# Patient Record
(demographics unavailable — no encounter records)

---

## 2024-10-26 NOTE — HISTORY OF PRESENT ILLNESS
[Fever] : FEVER [___ Day(s)] : [unfilled] day(s) [Constant] : constant [Ibuprofen] : ibuprofen [Sore Throat] : sore throat [Max Temp: ____] : Max temperature: [unfilled] [Sick Contacts: ___] : no sick contacts [Change in sleep pattern] : no change in sleep pattern [Ear Pain] : no ear pain [Decreased Appetite] : no decreased appetite [Vomiting] : no vomiting [Rash] : no rash [FreeTextEntry5] : intermittent abdominal pain - yesterday, BM - passed "nuggets"  [de-identified] : concerns that she had a fever on 10/1-10/2, resolved with no other symptoms present

## 2024-10-26 NOTE — DISCUSSION/SUMMARY
[FreeTextEntry1] : reviewed negative strep testing throat culture and RVP pending  discussed that etiology of current fever and fever earlier this month unclear at this time but may be viral  supportive care measures encouraged RTO if sx fail to improve or worsen  medical services provided in office outside of normally scheduled office hours

## 2024-11-04 NOTE — REVIEW OF SYSTEMS
[NI] : Genitourinary  [Nl] : Endocrine [Rhinorrhea] : rhinorrhea [Nasal Congestion] : nasal congestion [Postnasl Drip] : postnasal drip [Wheezing] : wheezing [Cough] : cough

## 2024-11-04 NOTE — PHYSICAL EXAM

## 2024-11-05 NOTE — PHYSICAL EXAM
[Well Nourished] : well nourished [Well Developed] : well developed [Alert] : ~L alert [Active] : active [Normal Breathing Pattern] : normal breathing pattern [No Respiratory Distress] : no respiratory distress [No Allergic Shiners] : no allergic shiners [No Drainage] : no drainage [No Conjunctivitis] : no conjunctivitis [Tympanic Membranes Clear] : tympanic membranes were clear [No Nasal Drainage] : no nasal drainage [No Polyps] : no polyps [No Sinus Tenderness] : no sinus tenderness [No Oral Pallor] : no oral pallor [No Oral Cyanosis] : no oral cyanosis [Non-Erythematous] : non-erythematous [No Exudates] : no exudates [No Postnasal Drip] : no postnasal drip [No Tonsillar Enlargement] : no tonsillar enlargement [Absence Of Retractions] : absence of retractions [Symmetric] : symmetric [Good Expansion] : good expansion [No Acc Muscle Use] : no accessory muscle use [Good aeration to bases] : good aeration to bases [Equal Breath Sounds] : equal breath sounds bilaterally [No Crackles] : no crackles [No Rhonchi] : no rhonchi [Normal Sinus Rhythm] : normal sinus rhythm [No Heart Murmur] : no heart murmur [Soft, Non-Tender] : soft, non-tender [No Hepatosplenomegaly] : no hepatosplenomegaly [Non Distended] : was not ~L distended [Abdomen Mass (___ Cm)] : no abdominal mass palpated [Full ROM] : full range of motion [No Clubbing] : no clubbing [Capillary Refill < 2 secs] : capillary refill less than two seconds [No Cyanosis] : no cyanosis [No Petechiae] : no petechiae [No Kyphoscoliosis] : no kyphoscoliosis [No Contractures] : no contractures [No Rashes] : no rashes [FreeTextEntry4] : congested nasal mucosa [de-identified] : age appropriate

## 2024-11-05 NOTE — CONSULT LETTER
[Dear  ___] : Dear  [unfilled], [Consult Letter:] : I had the pleasure of evaluating your patient, [unfilled]. [Please see my note below.] : Please see my note below. [Consult Closing:] : Thank you very much for allowing me to participate in the care of this patient.  If you have any questions, please do not hesitate to contact me. [Sincerely,] : Sincerely, [FreeTextEntry3] : If you have any questions please feel free to contact my office at [phone].\par  \par  Sincerely,\par  \par  Ashley Bradley, MSN, CPNP-PC\par  Pediatric Nurse Practitioner\par  Division of Pediatric Pulmonary Medicine & Cyst[facility] Fib[facility]\par  [facility]\par

## 2024-11-05 NOTE — REASON FOR VISIT
[Routine Follow-Up] : a routine follow-up visit for [Cough] : cough [Mother] : mother [Medical Records] : medical records [Father] : father

## 2024-11-05 NOTE — HISTORY OF PRESENT ILLNESS
[FreeTextEntry1] : Mild persistent asthma, eczema, seasonal allergies (dog, tree, grass and weed pollens)  Nov 05, 2024 FOLLOW UP: Interval Hx: -Last seen May 2024, recs: d/c ICS and restart  fluticasone propionate 44 2 puffs BID -Doing well per parents -Remained on  fluticasone propionate 44 2 puffs BID and no exacerbations since last visit Daily meds:  fluticasone propionate 44 2 puffs BID Rescue meds: Albuterol PRN Recent ER visits/hospitalizations: denies Last oral steroid course: denies Baseline daytime cough, SOB or wheeze: denies Baseline nocturnal cough, SOB or wheeze: denies Exertional cough, SOB or wheeze: denies Allergic rhinitis symptoms: yes  Flu vaccine 2024-25: yes ==   May 02, 2024 FOLLOW UP: Interval Hx: -Last seen Sept 2023, recs: Flovent 44 2 puffs BID -Doing well per father -Had strep throat in February and few mild URIs since last visit -Father notices alelrgic rhinitis in past few weeks, uses xyzal daily, afrin nasal spray for past 3 days Daily meds: Flovent 44 2 puffs BID, xyzal daily Rescue meds: Albuterol PRN Recent ER visits/hospitalizations: denies Last oral steroid course: denies Baseline daytime cough, SOB or wheeze: denies Baseline nocturnal cough, SOB or wheeze: denies Exertional cough, SOB or wheeze: denies Allergic rhinitis symptoms: yes  Flu vaccine [id number]:yes   ==   Sep 21, 2023 FOLLOW UP: Interval Hx:  -Last seen July 2023, recs: Flovent 44 2 puffs BID, if doing well d/c end of July and restart with first URI of the fall -Did well off ICS this summer, restarted on 9/1/23 -Traveled to [place] this summer and did well  -Has rash when exposed to dogs, uses albuterol before visiting [place] and aunt's house -Has mild URI symptoms for 2-3 days with nasal congestion Daily meds: Flovent 44 2 puffs BID, xyzal Rescue meds: Albuterol PRN  Recent ER visits/hospitalizations: denies  Last oral steroid course:  denies  Baseline daytime cough, SOB or wheeze: denies  Baseline nocturnal cough, SOB or wheeze:  denies  Exertional cough, SOB or wheeze: denies  Allergic rhinitis symptoms: xyzal  Flu vaccine: yes  ==  Jul 06, 2023 FOLLOW UP: Interval Hx:  -Last seen May 2023, recs: Flovent 44 2 puffs BID and prednisolone x 5 days for wheezing -Doing well per parents,  -Did not give OCS burst recommended at last visit, symptoms improved with albuterol q4h -Flovent 44 2 puffs BID -Albuterol PRN when exposed to dogs at cousin's house -Traveling to [facility] for 6 weeks  Daily meds: Flovent 44 2 puffs BID, xyzal Rescue meds: Albuterol PRN  Recent ER visits/hospitalizations: denies  Last oral steroid course:  denies  Baseline daytime cough, SOB or wheeze: denies  Baseline nocturnal cough, SOB or wheeze:  denies  Exertional cough, SOB or wheeze: denies  Allergic rhinitis symptoms: xyzal  Flu vaccine: yes  COVID 19 vaccine:  yes  Misc notes: Needs letter for school for albuterol PRN  ==   May 03, 2023 NEW PATIENT  3yr old female child with history of allergic rhinitis, recurrent cough with viral illnesses since fall/winter 2022 -Sounded "tight" with wheezing last Thursday at PCP, recs: albuterol and budesonide BID, wheeze is improved but cough is getting worse. Referred to pulm by PCP -Used albuterol x1 this AM (used q4h yesterday)  -Skin test +positive test to dog, tree, grass and weed pollens -Coughs when exposed to dogs (MGM, maternal aunt have pet dogs) -Vaccines UTD, rec flu vax and COVID 19 vax -Attends school   PMH: denies  PSH: denies  Meds: zyrtec, xyzal, afrin Birth Hx:  FT, NVSD- denies complications PCP/Specialists: Dr. Campbell Family hx:  Mo- Healthy Fa- Asthma, seasonal allergies s/p immunotherapy as child No siblings Family hx of asthma: father and maternal uncle Family hx of cystic fibrosis, autoimmune disease, recurrent respiratory infections: denies Feeding issues, CLARA:    denies Hx of Eczema:  yes , HC ointment PRN Hx of rhinitis, post nasal drip: yes  Hx of recurrent infections (ie: pneumonia, AOM, sinusitis): sinus infection annually  Seen by pulmonologist before:  denies   Cough Hx: Triggers: viral illnesses, sinus Allergies: yes  Hx of wheezing: yes x1 Use of oral steroids: denies ED/Hospitalizations:  ED as infant for UTI , no resp issues Snoring: denies Daytime cough: denies  Nighttime cough:  denies  Respiratory symptoms with exercise: denies in the past, more recently in past few weeks Chest x-ray: denies     Modified Asthma Predictive Index (mAPI): 4 wheezing illnesses AND 1 major criteria: Parental asthma   yes   atopic dermatitis   yes  aeroallergen sensitization   yes   OR  2 minor criteria: Food sensitization   NO  peripheral blood eosinophilia =4%  NO  wheezing apart from colds   NO

## 2024-11-05 NOTE — HISTORY OF PRESENT ILLNESS
[FreeTextEntry1] : Mild persistent asthma, eczema, seasonal allergies (dog, tree, grass and weed pollens)  Nov 05, 2024 FOLLOW UP: Interval Hx: -Last seen May 2024, recs: d/c ICS and restart  fluticasone propionate 44 2 puffs BID -Doing well per parents -Remained on  fluticasone propionate 44 2 puffs BID and no exacerbations since last visit Daily meds:  fluticasone propionate 44 2 puffs BID Rescue meds: Albuterol PRN Recent ER visits/hospitalizations: denies Last oral steroid course: denies Baseline daytime cough, SOB or wheeze: denies Baseline nocturnal cough, SOB or wheeze: denies Exertional cough, SOB or wheeze: denies Allergic rhinitis symptoms: yes  Flu vaccine 2024-25: yes ==   May 02, 2024 FOLLOW UP: Interval Hx: -Last seen Sept 2023, recs: Flovent 44 2 puffs BID -Doing well per father -Had strep throat in February and few mild URIs since last visit -Father notices alelrgic rhinitis in past few weeks, uses xyzal daily, afrin nasal spray for past 3 days Daily meds: Flovent 44 2 puffs BID, xyzal daily Rescue meds: Albuterol PRN Recent ER visits/hospitalizations: denies Last oral steroid course: denies Baseline daytime cough, SOB or wheeze: denies Baseline nocturnal cough, SOB or wheeze: denies Exertional cough, SOB or wheeze: denies Allergic rhinitis symptoms: yes  Flu vaccine [id number]:yes   ==   Sep 21, 2023 FOLLOW UP: Interval Hx:  -Last seen July 2023, recs: Flovent 44 2 puffs BID, if doing well d/c end of July and restart with first URI of the fall -Did well off ICS this summer, restarted on 9/1/23 -Traveled to [place] this summer and did well  -Has rash when exposed to dogs, uses albuterol before visiting [place] and aunt's house -Has mild URI symptoms for 2-3 days with nasal congestion Daily meds: Flovent 44 2 puffs BID, xyzal Rescue meds: Albuterol PRN  Recent ER visits/hospitalizations: denies  Last oral steroid course:  denies  Baseline daytime cough, SOB or wheeze: denies  Baseline nocturnal cough, SOB or wheeze:  denies  Exertional cough, SOB or wheeze: denies  Allergic rhinitis symptoms: xyzal  Flu vaccine: yes  ==  Jul 06, 2023 FOLLOW UP: Interval Hx:  -Last seen May 2023, recs: Flovent 44 2 puffs BID and prednisolone x 5 days for wheezing -Doing well per parents,  -Did not give OCS burst recommended at last visit, symptoms improved with albuterol q4h -Flovent 44 2 puffs BID -Albuterol PRN when exposed to dogs at cousin's house -Traveling to [facility] for 6 weeks  Daily meds: Flovent 44 2 puffs BID, xyzal Rescue meds: Albuterol PRN  Recent ER visits/hospitalizations: denies  Last oral steroid course:  denies  Baseline daytime cough, SOB or wheeze: denies  Baseline nocturnal cough, SOB or wheeze:  denies  Exertional cough, SOB or wheeze: denies  Allergic rhinitis symptoms: xyzal  Flu vaccine: yes  COVID 19 vaccine:  yes  Misc notes: Needs letter for school for albuterol PRN  ==   May 03, 2023 NEW PATIENT  3yr old female child with history of allergic rhinitis, recurrent cough with viral illnesses since fall/winter 2022 -Sounded "tight" with wheezing last Thursday at PCP, recs: albuterol and budesonide BID, wheeze is improved but cough is getting worse. Referred to pulm by PCP -Used albuterol x1 this AM (used q4h yesterday)  -Skin test +positive test to dog, tree, grass and weed pollens -Coughs when exposed to dogs (MGM, maternal aunt have pet dogs) -Vaccines UTD, rec flu vax and COVID 19 vax -Attends school   PMH: denies  PSH: denies  Meds: zyrtec, xyzal, afrin Birth Hx:  FT, NVSD- denies complications PCP/Specialists: Dr. Campbell Family hx:  Mo- Healthy Fa- Asthma, seasonal allergies s/p immunotherapy as child No siblings Family hx of asthma: father and maternal uncle Family hx of cystic fibrosis, autoimmune disease, recurrent respiratory infections: denies Feeding issues, CLARA:    denies Hx of Eczema:  yes , HC ointment PRN Hx of rhinitis, post nasal drip: yes  Hx of recurrent infections (ie: pneumonia, AOM, sinusitis): sinus infection annually  Seen by pulmonologist before:  denies   Cough Hx: Triggers: viral illnesses, sinus Allergies: yes  Hx of wheezing: yes x1 Use of oral steroids: denies ED/Hospitalizations:  ED as infant for UTI , no resp issues Snoring: denies Daytime cough: denies  Nighttime cough:  denies  Respiratory symptoms with exercise: denies in the past, more recently in past few weeks Chest x-ray: denies     Modified Asthma Predictive Index (mAPI): 4 wheezing illnesses AND 1 major criteria: Parental asthma   yes   atopic dermatitis   yes  aeroallergen sensitization   yes   OR  2 minor criteria: Food sensitization   NO  peripheral blood eosinophilia =4%  NO  wheezing apart from colds   NO

## 2024-11-05 NOTE — CONSULT LETTER
[Dear  ___] : Dear  [unfilled], [Consult Letter:] : I had the pleasure of evaluating your patient, [unfilled]. [Please see my note below.] : Please see my note below. [Consult Closing:] : Thank you very much for allowing me to participate in the care of this patient.  If you have any questions, please do not hesitate to contact me. [Sincerely,] : Sincerely, [FreeTextEntry3] : If you have any questions please feel free to contact my office at [phone].\par  \par  Sincerely,\par  \par  Ashley Bradley, MSN, CPNP-PC\par  Pediatric Nurse Practitioner\par  Division of Pediatric Pulmonary Medicine & Cystic Fibrosis Center\par  [facility]\par

## 2024-11-05 NOTE — PHYSICAL EXAM
[Well Nourished] : well nourished [Well Developed] : well developed [Alert] : ~L alert [Active] : active [Normal Breathing Pattern] : normal breathing pattern [No Respiratory Distress] : no respiratory distress [No Allergic Shiners] : no allergic shiners [No Drainage] : no drainage [No Conjunctivitis] : no conjunctivitis [Tympanic Membranes Clear] : tympanic membranes were clear [No Nasal Drainage] : no nasal drainage [No Polyps] : no polyps [No Sinus Tenderness] : no sinus tenderness [No Oral Pallor] : no oral pallor [No Oral Cyanosis] : no oral cyanosis [Non-Erythematous] : non-erythematous [No Exudates] : no exudates [No Postnasal Drip] : no postnasal drip [No Tonsillar Enlargement] : no tonsillar enlargement [Absence Of Retractions] : absence of retractions [Symmetric] : symmetric [Good Expansion] : good expansion [No Acc Muscle Use] : no accessory muscle use [Good aeration to bases] : good aeration to bases [Equal Breath Sounds] : equal breath sounds bilaterally [No Crackles] : no crackles [No Rhonchi] : no rhonchi [Normal Sinus Rhythm] : normal sinus rhythm [No Heart Murmur] : no heart murmur [Soft, Non-Tender] : soft, non-tender [No Hepatosplenomegaly] : no hepatosplenomegaly [Non Distended] : was not ~L distended [Abdomen Mass (___ Cm)] : no abdominal mass palpated [Full ROM] : full range of motion [No Clubbing] : no clubbing [Capillary Refill < 2 secs] : capillary refill less than two seconds [No Cyanosis] : no cyanosis [No Petechiae] : no petechiae [No Kyphoscoliosis] : no kyphoscoliosis [No Contractures] : no contractures [No Rashes] : no rashes [FreeTextEntry4] : congested nasal mucosa [de-identified] : age appropriate

## 2024-11-06 NOTE — HISTORY OF PRESENT ILLNESS
[Normal] : Normal [Brushing teeth] : Brushing teeth [Toothpaste] : Primary Fluoride Source: Toothpaste [In ] : In  [No difficulties with Homework] : No difficulties with homework  [No] : No cigarette smoke exposure [Water heater temperature set at <120 degrees F] : Water heater temperature set at <120 degrees F [Car seat in back seat] : Car seat in back seat [Carbon Monoxide Detectors] : Carbon monoxide detectors [Smoke Detectors] : Smoke detectors [Supervised outdoor play] : Supervised outdoor play [Up to date] : Up to date [Fruit] : fruit [Meat] : meat [Dairy] : dairy [Yes] : Patient goes to dentist yearly [Appropiate parent-child-sibling interaction] : Appropriate parent-child-sibling interaction [Child Cooperates] : Child cooperates [Parent has appropriate responses to behavior] : Parent has appropriate responses to behavior [FreeTextEntry3] : sleeps about 10 hours per night, pull up overnight

## 2024-11-06 NOTE — DEVELOPMENTAL MILESTONES
[Plays and interacts with peer] : plays and interacts with peer [None] : none [Goes to the bathroom independently] : goes to bathroom independently [Is dry through the day] :  is dry through the day [Counts 5 objects] : counts 5 objects [Names 3 or more numbers] : names 3 or more numbers [Copies a triangle] : copies a triangle [Copies first name] : copies first name [Writes 2 or more letters] : writes 2 or more letters [Yes] : Completed. [Dresses and undresses without help] : dresses and undresses without help [Walks on tiptoes when asked] : walks on tiptoes when asked [FreeTextEntry1] : recognizes sight words

## 2024-12-03 NOTE — DISCUSSION/SUMMARY
[FreeTextEntry1] : 5 year old female presents with persistent/worsening cough.  In no acute distress. Appears adequately hydrated. RVP pending due to concern for atypical PNA.  Return if symptoms worsen or persist.

## 2024-12-03 NOTE — HISTORY OF PRESENT ILLNESS
[FreeTextEntry6] : Patient's chief complaint is persistent cough x2 weeks.  Presents with c/o cough/congestion x 14 days. Has had trouble sleeping.  As per Dad cough is getting worse.  +throat pain when coughing Was recently treated for Sinusitis with Cefdinir. Last dose of medication was Saturday.  Fever: none   Meds given: Albuterol, Flovent  Appetite/activity at baseline, drinking well, good UO. No vomiting/No diarrhea. + school/sick contacts.  Potential Allergy to Penicillin.

## 2024-12-03 NOTE — PHYSICAL EXAM
[Erythematous Oropharynx] : erythematous oropharynx [Clear to Auscultation Bilaterally] : clear to auscultation bilaterally [Wheezing] : no wheezing [Crackles] : no crackles [Tachypnea] : no tachypnea [NL] : warm, clear [FreeTextEntry4] : +congestion [FreeTextEntry7] : +dry cough

## 2025-02-15 NOTE — PHYSICAL EXAM
[NL] : no acute distress, alert [Erythematous Labia Majora] : erythematous labia majora [Patent] : patent [Erythema surrounding anus] : no erythema surrounding anus

## 2025-02-15 NOTE — DISCUSSION/SUMMARY
[FreeTextEntry1] : Discussed that skin symptoms are consistent with vaginitis  - Recommend to restart Nystatin Triamcinolone as previously prescribed  -Daily warm bathing is helpful as follows: Allow the child to soak in clean water (no soap) for 10 to 15 minutes. Use soap to wash regions other than the genital area just before taking the child out of the tub. Limit use of any soap on genital areas. Rinse the genital area well and gently pat dry. -Encouraged use of water for cleansing of the vaginal area on the nights where she is not bathing  -Do not use bubble baths or perfumed soaps. -Continue application of emollients, which may help protect skin. -Review hygiene with the child. Emphasize wiping front-to-back after bowel movements. Have her sit with knees apart to reduce reflux of urine into the vagina. If she has trouble with this position because of small size, she can use a smaller detachable seat or sit backwards on the toilet (facing the toilet).  Discussed with parents that if symptoms fail to improve, would consider consultation with Peds Derm for assessment of possible lichen sclerosis   medical services provided in office outside of normally scheduled office hours

## 2025-02-15 NOTE — HISTORY OF PRESENT ILLNESS
[de-identified] : vaginal cramping   [FreeTextEntry6] : describes pinching when she urinates  complaints are intermittent   seen here on 2/5 with complaints of frequent urination  - family had been on vacation in [place] when symptoms began, but denied swimming while there  began treatment with Nystatin as prescribed at that time- used for 5 days with initial improvement  currently treating with Aquaphor at night  continued use of a Pull Up at night   there has been no strong smell to urine  wipes independently at school  no use of flushable wipes at home   no constipation concerns

## 2025-02-19 NOTE — REASON FOR VISIT
[Routine Follow-Up] : a routine follow-up visit for [Cough] : cough [Mother] : mother [Father] : father [Medical Records] : medical records

## 2025-02-20 NOTE — HISTORY OF PRESENT ILLNESS
[FreeTextEntry1] : Mild persistent asthma, eczema, seasonal allergies (dog, tree, grass and weed pollens)  Feb 20, 2025 FOLLOW UP: Interval Hx: -Last seen Nov 2024, recs:  fluticasone propionate 44 2 puffs BID -Doing well - Daily meds: Rescue meds: Albuterol PRN Recent ER visits/hospitalizations: Last oral steroid course: Baseline daytime cough, SOB or wheeze: Baseline nocturnal cough, SOB or wheeze: Exertional cough, SOB or wheeze: Allergic rhinitis symptoms: Flu vaccine [id number]: COVID 19 vaccine: Misc notes: ==   Nov 05, 2024 FOLLOW UP: Interval Hx: -Last seen May 2024, recs: d/c ICS and restart  fluticasone propionate 44 2 puffs BID -Doing well per parents -Remained on  fluticasone propionate 44 2 puffs BID and no exacerbations since last visit Daily meds:  fluticasone propionate 44 2 puffs BID Rescue meds: Albuterol PRN Recent ER visits/hospitalizations: denies Last oral steroid course: denies Baseline daytime cough, SOB or wheeze: denies Baseline nocturnal cough, SOB or wheeze: denies Exertional cough, SOB or wheeze: denies Allergic rhinitis symptoms: yes  Flu vaccine 2024-25: yes ==   May 02, 2024 FOLLOW UP: Interval Hx: -Last seen Sept 2023, recs: Flovent 44 2 puffs BID -Doing well per father -Had strep throat in February and few mild URIs since last visit -Father notices alelrgic rhinitis in past few weeks, uses xyzal daily, afrin nasal spray for past 3 days Daily meds: Flovent 44 2 puffs BID, xyzal daily Rescue meds: Albuterol PRN Recent ER visits/hospitalizations: denies Last oral steroid course: denies Baseline daytime cough, SOB or wheeze: denies Baseline nocturnal cough, SOB or wheeze: denies Exertional cough, SOB or wheeze: denies Allergic rhinitis symptoms: yes  Flu vaccine [id number]:yes   ==   Sep 21, 2023 FOLLOW UP: Interval Hx:  -Last seen July 2023, recs: Flovent 44 2 puffs BID, if doing well d/c end of July and restart with first URI of the fall -Did well off ICS this summer, restarted on 9/1/23 -Traveled to [facility] this summer and did well  -Has rash when exposed to dogs, uses albuterol before visiting [place] and aunt's house -Has mild URI symptoms for 2-3 days with nasal congestion Daily meds: Flovent 44 2 puffs BID, xyzal Rescue meds: Albuterol PRN  Recent ER visits/hospitalizations: denies  Last oral steroid course:  denies  Baseline daytime cough, SOB or wheeze: denies  Baseline nocturnal cough, SOB or wheeze:  denies  Exertional cough, SOB or wheeze: denies  Allergic rhinitis symptoms: xyzal  Flu vaccine: yes  ==  Jul 06, 2023 FOLLOW UP: Interval Hx:  -Last seen May 2023, recs: Flovent 44 2 puffs BID and prednisolone x 5 days for wheezing -Doing well per parents,  -Did not give OCS burst recommended at last visit, symptoms improved with albuterol q4h -Flovent 44 2 puffs BID -Albuterol PRN when exposed to dogs at cousin's house -Traveling to [facility] for 6 weeks  Daily meds: Flovent 44 2 puffs BID, xyzal Rescue meds: Albuterol PRN  Recent ER visits/hospitalizations: denies  Last oral steroid course:  denies  Baseline daytime cough, SOB or wheeze: denies  Baseline nocturnal cough, SOB or wheeze:  denies  Exertional cough, SOB or wheeze: denies  Allergic rhinitis symptoms: xyzal  Flu vaccine: yes  COVID 19 vaccine:  yes  Misc notes: Needs letter for school for albuterol PRN  ==   May 03, 2023 NEW PATIENT  3yr old female child with history of allergic rhinitis, recurrent cough with viral illnesses since fall/winter 2022 -Sounded "tight" with wheezing last Thursday at PCP, recs: albuterol and budesonide BID, wheeze is improved but cough is getting worse. Referred to pulm by PCP -Used albuterol x1 this AM (used q4h yesterday)  -Skin test +positive test to dog, tree, grass and weed pollens -Coughs when exposed to dogs (MGM, maternal aunt have pet dogs) -Vaccines UTD, rec flu vax and COVID 19 vax -Attends school   PMH: denies  PSH: denies  Meds: zyrtec, xyzal, afrin Birth Hx:  FT, NVSD- denies complications PCP/Specialists: Dr. Campbell Family hx:  Mo- Healthy Fa- Asthma, seasonal allergies s/p immunotherapy as child No siblings Family hx of asthma: father and maternal uncle Family hx of cystic fibrosis, autoimmune disease, recurrent respiratory infections: denies Feeding issues, CLARA:    denies Hx of Eczema:  yes , HC ointment PRN Hx of rhinitis, post nasal drip: yes  Hx of recurrent infections (ie: pneumonia, AOM, sinusitis): sinus infection annually  Seen by pulmonologist before:  denies   Cough Hx: Triggers: viral illnesses, sinus Allergies: yes  Hx of wheezing: yes x1 Use of oral steroids: denies ED/Hospitalizations:  ED as infant for UTI , no resp issues Snoring: denies Daytime cough: denies  Nighttime cough:  denies  Respiratory symptoms with exercise: denies in the past, more recently in past few weeks Chest x-ray: denies     Modified Asthma Predictive Index (mAPI): 4 wheezing illnesses AND 1 major criteria: Parental asthma   yes   atopic dermatitis   yes  aeroallergen sensitization   yes   OR  2 minor criteria: Food sensitization   NO  peripheral blood eosinophilia =4%  NO  wheezing apart from colds   NO

## 2025-02-20 NOTE — CONSULT LETTER
[Dear  ___] : Dear  [unfilled], [Consult Letter:] : I had the pleasure of evaluating your patient, [unfilled]. [Please see my note below.] : Please see my note below. [Consult Closing:] : Thank you very much for allowing me to participate in the care of this patient.  If you have any questions, please do not hesitate to contact me. [Sincerely,] : Sincerely, [FreeTextEntry3] : If you have any questions please feel free to contact my office at [phone].\par  \par  Sincerely,\par  \par  Ashley Bradley, MSN, CPNP-PC\par  Pediatric Nurse Practitioner\par  Division of Pediatric Pulmonary Medicine & Cystic Fibrosis Center\par  [facility]\par

## 2025-02-20 NOTE — PHYSICAL EXAM
[Well Nourished] : well nourished [Well Developed] : well developed [Alert] : ~L alert [Active] : active [Normal Breathing Pattern] : normal breathing pattern [No Respiratory Distress] : no respiratory distress [No Allergic Shiners] : no allergic shiners [No Drainage] : no drainage [No Conjunctivitis] : no conjunctivitis [Tympanic Membranes Clear] : tympanic membranes were clear [No Nasal Drainage] : no nasal drainage [No Polyps] : no polyps [No Sinus Tenderness] : no sinus tenderness [No Oral Pallor] : no oral pallor [No Oral Cyanosis] : no oral cyanosis [Non-Erythematous] : non-erythematous [No Exudates] : no exudates [No Postnasal Drip] : no postnasal drip [No Tonsillar Enlargement] : no tonsillar enlargement [Absence Of Retractions] : absence of retractions [Symmetric] : symmetric [Good Expansion] : good expansion [No Acc Muscle Use] : no accessory muscle use [Good aeration to bases] : good aeration to bases [Equal Breath Sounds] : equal breath sounds bilaterally [No Crackles] : no crackles [No Rhonchi] : no rhonchi [Normal Sinus Rhythm] : normal sinus rhythm [No Heart Murmur] : no heart murmur [Soft, Non-Tender] : soft, non-tender [No Hepatosplenomegaly] : no hepatosplenomegaly [Non Distended] : was not ~L distended [Abdomen Mass (___ Cm)] : no abdominal mass palpated [Full ROM] : full range of motion [No Clubbing] : no clubbing [Capillary Refill < 2 secs] : capillary refill less than two seconds [No Cyanosis] : no cyanosis [No Petechiae] : no petechiae [No Kyphoscoliosis] : no kyphoscoliosis [No Contractures] : no contractures [No Rashes] : no rashes [de-identified] : age appropriate

## 2025-03-12 NOTE — HISTORY OF PRESENT ILLNESS
[de-identified] : fever [FreeTextEntry6] : Presents with c/o fever Tmax 103-104 x 2 days - this morning 102.  Sore throat - no other URI s/s. Meds given: Motrin PRN.  Appetite OK/activity at baseline when afebrile, drinking well, good UO.  No vomiting/No diarrhea.  No abdominal pain.  + school/sick contacts.

## 2025-03-12 NOTE — DISCUSSION/SUMMARY
[FreeTextEntry1] :  5 year girl found to be rapid strep positive.  Complete 10 days of antibiotics as directed.  Dad requests to sent RVP.  After being on antibiotics for at least 24 hours patient less likely to spread infection. New toothbrush in 3d and after treatment complete.  Supportive care reviewed -- Nasal saline PRN, humidifier, Tylenol/Motrin dosing/intervals/indications reviewed PRN. Good hydration discussed & good hand hygiene reviewed  If fever persists > 48 hr or condition worsens return for re-eval. Masking, social distancing and hand hygiene reviewed. RED FLAGS REVIEWED - indications for ED eval discussed, signs of distress/dehydration reviewed - Dad agrees with plan, demonstrates an understanding, is able to repeat back instructions and has no questions at this time.   AAP 5210 reviewed--  once feeling better may resume normal activity & diet.  Return sooner PRN.  Well care as scheduled.

## 2025-03-12 NOTE — PHYSICAL EXAM
[NL] : warm, clear [Erythematous Oropharynx] : erythematous oropharynx [Palate petechiae] : palate petechiae [Anterior Cervical] : anterior cervical

## 2025-07-15 NOTE — PHYSICAL EXAM
[Well Nourished] : well nourished [Well Developed] : well developed [Alert] : ~L alert [Active] : active [Normal Breathing Pattern] : normal breathing pattern [No Respiratory Distress] : no respiratory distress [No Allergic Shiners] : no allergic shiners [No Drainage] : no drainage [No Conjunctivitis] : no conjunctivitis [Tympanic Membranes Clear] : tympanic membranes were clear [No Nasal Drainage] : no nasal drainage [No Polyps] : no polyps [No Sinus Tenderness] : no sinus tenderness [No Oral Pallor] : no oral pallor [No Oral Cyanosis] : no oral cyanosis [Non-Erythematous] : non-erythematous [No Exudates] : no exudates [No Postnasal Drip] : no postnasal drip [No Tonsillar Enlargement] : no tonsillar enlargement [Absence Of Retractions] : absence of retractions [Symmetric] : symmetric [Good Expansion] : good expansion [No Acc Muscle Use] : no accessory muscle use [Good aeration to bases] : good aeration to bases [Equal Breath Sounds] : equal breath sounds bilaterally [No Crackles] : no crackles [No Rhonchi] : no rhonchi [Normal Sinus Rhythm] : normal sinus rhythm [No Heart Murmur] : no heart murmur [Soft, Non-Tender] : soft, non-tender [No Hepatosplenomegaly] : no hepatosplenomegaly [Non Distended] : was not ~L distended [Abdomen Mass (___ Cm)] : no abdominal mass palpated [Full ROM] : full range of motion [No Clubbing] : no clubbing [Capillary Refill < 2 secs] : capillary refill less than two seconds [No Cyanosis] : no cyanosis [No Petechiae] : no petechiae [No Kyphoscoliosis] : no kyphoscoliosis [No Contractures] : no contractures [No Rashes] : no rashes [Nasal Mucosa Non-Edematous] : nasal mucosa non-edematous [FreeTextEntry2] : arthur oliver/l  [de-identified] : age appropriate

## 2025-07-15 NOTE — CONSULT LETTER
[Dear  ___] : Dear  [unfilled], [Consult Letter:] : I had the pleasure of evaluating your patient, [unfilled]. [Please see my note below.] : Please see my note below. [Consult Closing:] : Thank you very much for allowing me to participate in the care of this patient.  If you have any questions, please do not hesitate to contact me. [Sincerely,] : Sincerely, [FreeTextEntry3] : If you have any questions please feel free to contact my office at [phone].\par  \par  Sincerely,\par  \par  Ashley Bradley, MSN, CPNP-PC\par  Pediatric Nurse Practitioner\par  Division of Pediatric Pulmonary Medicine & Cystic Fibrosis Center\par  [facility]\par

## 2025-07-15 NOTE — CONSULT LETTER
[Dear  ___] : Dear  [unfilled], [Consult Letter:] : I had the pleasure of evaluating your patient, [unfilled]. [Please see my note below.] : Please see my note below. [Consult Closing:] : Thank you very much for allowing me to participate in the care of this patient.  If you have any questions, please do not hesitate to contact me. [Sincerely,] : Sincerely, [FreeTextEntry3] : If you have any questions please feel free to contact my office at [phone].\par  \par  Sincerely,\par  \par  Ashley Bradley, MSN, CPNP-PC\par  Pediatric Nurse Practitioner\par  Division of Pediatric Pulmonary Medicine & Cystic Fibrosis Center\par  [facility]\par

## 2025-07-15 NOTE — PHYSICAL EXAM
[Well Nourished] : well nourished [Well Developed] : well developed [Alert] : ~L alert [Active] : active [Normal Breathing Pattern] : normal breathing pattern [No Respiratory Distress] : no respiratory distress [No Allergic Shiners] : no allergic shiners [No Drainage] : no drainage [No Conjunctivitis] : no conjunctivitis [Tympanic Membranes Clear] : tympanic membranes were clear [No Nasal Drainage] : no nasal drainage [No Polyps] : no polyps [No Sinus Tenderness] : no sinus tenderness [No Oral Pallor] : no oral pallor [No Oral Cyanosis] : no oral cyanosis [Non-Erythematous] : non-erythematous [No Exudates] : no exudates [No Postnasal Drip] : no postnasal drip [No Tonsillar Enlargement] : no tonsillar enlargement [Absence Of Retractions] : absence of retractions [Symmetric] : symmetric [Good Expansion] : good expansion [No Acc Muscle Use] : no accessory muscle use [Good aeration to bases] : good aeration to bases [Equal Breath Sounds] : equal breath sounds bilaterally [No Crackles] : no crackles [No Rhonchi] : no rhonchi [Normal Sinus Rhythm] : normal sinus rhythm [No Heart Murmur] : no heart murmur [Soft, Non-Tender] : soft, non-tender [No Hepatosplenomegaly] : no hepatosplenomegaly [Non Distended] : was not ~L distended [Abdomen Mass (___ Cm)] : no abdominal mass palpated [Full ROM] : full range of motion [No Clubbing] : no clubbing [Capillary Refill < 2 secs] : capillary refill less than two seconds [No Cyanosis] : no cyanosis [No Petechiae] : no petechiae [No Kyphoscoliosis] : no kyphoscoliosis [No Contractures] : no contractures [No Rashes] : no rashes [Nasal Mucosa Non-Edematous] : nasal mucosa non-edematous [FreeTextEntry2] : arthur oliver/l  [de-identified] : age appropriate

## 2025-07-15 NOTE — HISTORY OF PRESENT ILLNESS
[FreeTextEntry1] : Mild persistent asthma, eczema, seasonal allergies (dog, tree, grass and weed pollens)  Jul 15, 2025 FOLLOW UP Interval Hx: -Last seen Nov 2024, recs:  fluticasone propionate 44mcg 2 puffs BID -Did well over winter and spring months -Hx of RSV in Dec 2024 with prolonged cough, no OCS needed -Some allergy symptoms but overall mild, maintained on xyzal and flonase daily Daily meds: fluticasone propionate 44mcg 2 puffs BID Rescue meds: Albuterol PRN Recent ER visits/hospitalizations: denies Last oral steroid course: denies Baseline daytime cough, SOB or wheeze: denies Baseline nocturnal cough, SOB or wheeze: denies Exertional cough, SOB or wheeze: denies Allergic rhinitis symptoms: yes  Flu vaccine 2024-25: yes  Nov 05, 2024 FOLLOW UP: Interval Hx: -Last seen May 2024, recs: d/c ICS and restart  fluticasone propionate 44 2 puffs BID -Doing well per parents -Remained on  fluticasone propionate 44 2 puffs BID and no exacerbations since last visit Daily meds:  fluticasone propionate 44 2 puffs BID Rescue meds: Albuterol PRN Recent ER visits/hospitalizations: denies Last oral steroid course: denies Baseline daytime cough, SOB or wheeze: denies Baseline nocturnal cough, SOB or wheeze: denies Exertional cough, SOB or wheeze: denies Allergic rhinitis symptoms: yes  Flu vaccine 2024-25: yes ==   May 02, 2024 FOLLOW UP: Interval Hx: -Last seen Sept 2023, recs: Flovent 44 2 puffs BID -Doing well per father -Had strep throat in February and few mild URIs since last visit -Father notices alelrgic rhinitis in past few weeks, uses xyzal daily, afrin nasal spray for past 3 days Daily meds: Flovent 44 2 puffs BID, xyzal daily Rescue meds: Albuterol PRN Recent ER visits/hospitalizations: denies Last oral steroid course: denies Baseline daytime cough, SOB or wheeze: denies Baseline nocturnal cough, SOB or wheeze: denies Exertional cough, SOB or wheeze: denies Allergic rhinitis symptoms: yes  Flu vaccine [id number]:yes   ==   Sep 21, 2023 FOLLOW UP: Interval Hx:  -Last seen July 2023, recs: Flovent 44 2 puffs BID, if doing well d/c end of July and restart with first URI of the fall -Did well off ICS this summer, restarted on 9/1/23 -Traveled to [facility] this summer and did well  -Has rash when exposed to dogs, uses albuterol before visiting [place] and aunt's house -Has mild URI symptoms for 2-3 days with nasal congestion Daily meds: Flovent 44 2 puffs BID, xyzal Rescue meds: Albuterol PRN  Recent ER visits/hospitalizations: denies  Last oral steroid course:  denies  Baseline daytime cough, SOB or wheeze: denies  Baseline nocturnal cough, SOB or wheeze:  denies  Exertional cough, SOB or wheeze: denies  Allergic rhinitis symptoms: xyzal  Flu vaccine: yes  ==  Jul 06, 2023 FOLLOW UP: Interval Hx:  -Last seen May 2023, recs: Flovent 44 2 puffs BID and prednisolone x 5 days for wheezing -Doing well per parents,  -Did not give OCS burst recommended at last visit, symptoms improved with albuterol q4h -Flovent 44 2 puffs BID -Albuterol PRN when exposed to dogs at cousin's house -Traveling to [place] for 6 weeks  Daily meds: Flovent 44 2 puffs BID, xyzal Rescue meds: Albuterol PRN  Recent ER visits/hospitalizations: denies  Last oral steroid course:  denies  Baseline daytime cough, SOB or wheeze: denies  Baseline nocturnal cough, SOB or wheeze:  denies  Exertional cough, SOB or wheeze: denies  Allergic rhinitis symptoms: xyzal  Flu vaccine: yes  COVID 19 vaccine:  yes  Misc notes: Needs letter for school for albuterol PRN  ==   May 03, 2023 NEW PATIENT  3yr old female child with history of allergic rhinitis, recurrent cough with viral illnesses since fall/winter 2022 -Sounded "tight" with wheezing last Thursday at PCP, recs: albuterol and budesonide BID, wheeze is improved but cough is getting worse. Referred to pulm by PCP -Used albuterol x1 this AM (used q4h yesterday)  -Skin test +positive test to dog, tree, grass and weed pollens -Coughs when exposed to dogs (MGM, maternal aunt have pet dogs) -Vaccines UTD, rec flu vax and COVID 19 vax -Attends school   PMH: denies  PSH: denies  Meds: zyrtec, xyzal, afrin Birth Hx:  FT, NVSD- denies complications PCP/Specialists: Dr. Campbell Family hx:  Mo- Healthy Fa- Asthma, seasonal allergies s/p immunotherapy as child No siblings Family hx of asthma: father and maternal uncle Family hx of cystic fibrosis, autoimmune disease, recurrent respiratory infections: denies Feeding issues, CLARA:    denies Hx of Eczema:  yes , HC ointment PRN Hx of rhinitis, post nasal drip: yes  Hx of recurrent infections (ie: pneumonia, AOM, sinusitis): sinus infection annually  Seen by pulmonologist before:  denies   Cough Hx: Triggers: viral illnesses, sinus Allergies: yes  Hx of wheezing: yes x1 Use of oral steroids: denies ED/Hospitalizations:  ED as infant for UTI , no resp issues Snoring: denies Daytime cough: denies  Nighttime cough:  denies  Respiratory symptoms with exercise: denies in the past, more recently in past few weeks Chest x-ray: denies     Modified Asthma Predictive Index (mAPI): 4 wheezing illnesses AND 1 major criteria: Parental asthma   yes   atopic dermatitis   yes  aeroallergen sensitization   yes   OR  2 minor criteria: Food sensitization   NO  peripheral blood eosinophilia =4%  NO  wheezing apart from colds   NO

## 2025-07-15 NOTE — HISTORY OF PRESENT ILLNESS
[FreeTextEntry1] : Mild persistent asthma, eczema, seasonal allergies (dog, tree, grass and weed pollens)  Jul 15, 2025 FOLLOW UP Interval Hx: -Last seen Nov 2024, recs:  fluticasone propionate 44mcg 2 puffs BID -Did well over winter and spring months -Hx of RSV in Dec 2024 with prolonged cough, no OCS needed -Some allergy symptoms but overall mild, maintained on xyzal and flonase daily Daily meds: fluticasone propionate 44mcg 2 puffs BID Rescue meds: Albuterol PRN Recent ER visits/hospitalizations: denies Last oral steroid course: denies Baseline daytime cough, SOB or wheeze: denies Baseline nocturnal cough, SOB or wheeze: denies Exertional cough, SOB or wheeze: denies Allergic rhinitis symptoms: yes  Flu vaccine 2024-25: yes  Nov 05, 2024 FOLLOW UP: Interval Hx: -Last seen May 2024, recs: d/c ICS and restart  fluticasone propionate 44 2 puffs BID -Doing well per parents -Remained on  fluticasone propionate 44 2 puffs BID and no exacerbations since last visit Daily meds:  fluticasone propionate 44 2 puffs BID Rescue meds: Albuterol PRN Recent ER visits/hospitalizations: denies Last oral steroid course: denies Baseline daytime cough, SOB or wheeze: denies Baseline nocturnal cough, SOB or wheeze: denies Exertional cough, SOB or wheeze: denies Allergic rhinitis symptoms: yes  Flu vaccine 2024-25: yes ==   May 02, 2024 FOLLOW UP: Interval Hx: -Last seen Sept 2023, recs: Flovent 44 2 puffs BID -Doing well per father -Had strep throat in February and few mild URIs since last visit -Father notices alelrgic rhinitis in past few weeks, uses xyzal daily, afrin nasal spray for past 3 days Daily meds: Flovent 44 2 puffs BID, xyzal daily Rescue meds: Albuterol PRN Recent ER visits/hospitalizations: denies Last oral steroid course: denies Baseline daytime cough, SOB or wheeze: denies Baseline nocturnal cough, SOB or wheeze: denies Exertional cough, SOB or wheeze: denies Allergic rhinitis symptoms: yes  Flu vaccine [id number]:yes   ==   Sep 21, 2023 FOLLOW UP: Interval Hx:  -Last seen July 2023, recs: Flovent 44 2 puffs BID, if doing well d/c end of July and restart with first URI of the fall -Did well off ICS this summer, restarted on 9/1/23 -Traveled to [facility] this summer and did well  -Has rash when exposed to dogs, uses albuterol before visiting [place] and aunt's house -Has mild URI symptoms for 2-3 days with nasal congestion Daily meds: Flovent 44 2 puffs BID, xyzal Rescue meds: Albuterol PRN  Recent ER visits/hospitalizations: denies  Last oral steroid course:  denies  Baseline daytime cough, SOB or wheeze: denies  Baseline nocturnal cough, SOB or wheeze:  denies  Exertional cough, SOB or wheeze: denies  Allergic rhinitis symptoms: xyzal  Flu vaccine: yes  ==  Jul 06, 2023 FOLLOW UP: Interval Hx:  -Last seen May 2023, recs: Flovent 44 2 puffs BID and prednisolone x 5 days for wheezing -Doing well per parents,  -Did not give OCS burst recommended at last visit, symptoms improved with albuterol q4h -Flovent 44 2 puffs BID -Albuterol PRN when exposed to dogs at cousin's house -Traveling to [place] for 6 weeks  Daily meds: Flovent 44 2 puffs BID, xyzal Rescue meds: Albuterol PRN  Recent ER visits/hospitalizations: denies  Last oral steroid course:  denies  Baseline daytime cough, SOB or wheeze: denies  Baseline nocturnal cough, SOB or wheeze:  denies  Exertional cough, SOB or wheeze: denies  Allergic rhinitis symptoms: xyzal  Flu vaccine: yes  COVID 19 vaccine:  yes  Misc notes: Needs letter for school for albuterol PRN  ==   May 03, 2023 NEW PATIENT  3yr old female child with history of allergic rhinitis, recurrent cough with viral illnesses since fall/winter 2022 -Sounded "tight" with wheezing last Thursday at PCP, recs: albuterol and budesonide BID, wheeze is improved but cough is getting worse. Referred to pulm by PCP -Used albuterol x1 this AM (used q4h yesterday)  -Skin test +positive test to dog, tree, grass and weed pollens -Coughs when exposed to dogs (MGM, maternal aunt have pet dogs) -Vaccines UTD, rec flu vax and COVID 19 vax -Attends school   PMH: denies  PSH: denies  Meds: zyrtec, xyzal, afrin Birth Hx:  FT, NVSD- denies complications PCP/Specialists: Dr. Campbell Family hx:  Mo- Healthy Fa- Asthma, seasonal allergies s/p immunotherapy as child No siblings Family hx of asthma: father and maternal uncle Family hx of cystic fibrosis, autoimmune disease, recurrent respiratory infections: denies Feeding issues, CLARA:    denies Hx of Eczema:  yes , HC ointment PRN Hx of rhinitis, post nasal drip: yes  Hx of recurrent infections (ie: pneumonia, AOM, sinusitis): sinus infection annually  Seen by pulmonologist before:  denies   Cough Hx: Triggers: viral illnesses, sinus Allergies: yes  Hx of wheezing: yes x1 Use of oral steroids: denies ED/Hospitalizations:  ED as infant for UTI , no resp issues Snoring: denies Daytime cough: denies  Nighttime cough:  denies  Respiratory symptoms with exercise: denies in the past, more recently in past few weeks Chest x-ray: denies     Modified Asthma Predictive Index (mAPI): 4 wheezing illnesses AND 1 major criteria: Parental asthma   yes   atopic dermatitis   yes  aeroallergen sensitization   yes   OR  2 minor criteria: Food sensitization   NO  peripheral blood eosinophilia =4%  NO  wheezing apart from colds   NO